# Patient Record
Sex: MALE | Race: WHITE | NOT HISPANIC OR LATINO | Employment: FULL TIME | URBAN - METROPOLITAN AREA
[De-identification: names, ages, dates, MRNs, and addresses within clinical notes are randomized per-mention and may not be internally consistent; named-entity substitution may affect disease eponyms.]

---

## 2020-02-04 ENCOUNTER — OFFICE VISIT (OUTPATIENT)
Dept: URGENT CARE | Age: 30
End: 2020-02-04
Payer: COMMERCIAL

## 2020-02-04 VITALS
OXYGEN SATURATION: 98 % | WEIGHT: 135 LBS | HEART RATE: 61 BPM | RESPIRATION RATE: 16 BRPM | SYSTOLIC BLOOD PRESSURE: 119 MMHG | HEIGHT: 68 IN | TEMPERATURE: 98.4 F | DIASTOLIC BLOOD PRESSURE: 73 MMHG | BODY MASS INDEX: 20.46 KG/M2

## 2020-02-04 DIAGNOSIS — J06.9 ACUTE UPPER RESPIRATORY INFECTION: Primary | ICD-10-CM

## 2020-02-04 DIAGNOSIS — J01.40 ACUTE NON-RECURRENT PANSINUSITIS: ICD-10-CM

## 2020-02-04 PROCEDURE — 99203 OFFICE O/P NEW LOW 30 MIN: CPT | Performed by: FAMILY MEDICINE

## 2020-02-04 PROCEDURE — 99283 EMERGENCY DEPT VISIT LOW MDM: CPT | Performed by: FAMILY MEDICINE

## 2020-02-04 PROCEDURE — G0382 LEV 3 HOSP TYPE B ED VISIT: HCPCS | Performed by: FAMILY MEDICINE

## 2020-02-04 RX ORDER — AMOXICILLIN AND CLAVULANATE POTASSIUM 875; 125 MG/1; MG/1
1 TABLET, FILM COATED ORAL EVERY 12 HOURS SCHEDULED
Qty: 20 TABLET | Refills: 0 | Status: SHIPPED | OUTPATIENT
Start: 2020-02-04 | End: 2020-02-14

## 2020-02-04 RX ORDER — CITALOPRAM 20 MG/1
TABLET ORAL
COMMUNITY
Start: 2020-01-08

## 2020-02-04 NOTE — LETTER
February 4, 2020     Patient: Tayla Irwin   YOB: 1990   Date of Visit: 2/4/2020       To Whom It May Concern: It is my medical opinion that Tayla Irwin may return to work on 02/06/2020  If you have any questions or concerns, please don't hesitate to call           Sincerely,        Katherine Barnett DO    CC: No Recipients

## 2020-02-04 NOTE — PROGRESS NOTES
[facility]        NAME: Kathie Alcala is a 26 y o  male  : 1990    MRN: [id number]  DATE: 2020  TIME: 12:07 PM    Assessment and Plan   Acute upper respiratory infection [J06 9]  1  Acute upper respiratory infection     2  Acute non-recurrent pansinusitis  amoxicillin-clavulanate (AUGMENTIN) 875-125 mg per tablet         Patient Instructions     Patient Instructions   Augmentin twice a day until finished (please take probiotics)  Cold/sinus medication as needed (try Flonase nasal spray 1 spray in each nostril once or twice a day)  Tylenol, or ibuprofen (Advil/Motrin), or try Aleve (please take with food) as needed  Recheck/follow-up with family physician as needed  Please go to the hospital emergency department if needed  Follow up with PCP in 3-5 days  Proceed to  ER if symptoms worsen  Chief Complaint     Chief Complaint   Patient presents with    Nasal Congestion     Pt complaining of congestion, fever, and pressure behind eyes x2 days  Has tried dayquil  History of Present Illness       Fever, congestion, sinus pressure      Review of Systems   Review of Systems   Constitutional: Positive for fever  HENT: Positive for congestion and sinus pressure  Respiratory: Negative  Cardiovascular: Negative  Musculoskeletal: Negative  Skin: Negative  Neurological: Negative            Current Medications       Current Outpatient Medications:     citalopram (CeleXA) 20 mg tablet, TAKE 1 TABLET BY MOUTH EVERY DAY, Disp: , Rfl:     acyclovir (ZOVIRAX) 800 mg tablet, Take 1 tablet by mouth 2 (two) times a day for 5 days, Disp: 10 tablet, Rfl: 0    amoxicillin-clavulanate (AUGMENTIN) 875-125 mg per tablet, Take 1 tablet by mouth every 12 (twelve) hours for 20 doses, Disp: 20 tablet, Rfl: 0    ibuprofen (MOTRIN) 600 mg tablet, Take 1 tablet by mouth every 6 (six) hours as needed for mild pain (Patient not taking: Reported on 2020), Disp: 30 tablet, Rfl: 0    oxyCODONE-acetaminophen (PERCOCET) 5-325 mg per tablet, Take 1 tablet by mouth every 6 (six) hours as needed for severe pain Max Daily Amount: 4 tablets (Patient not taking: Reported on 2/4/2020), Disp: 12 tablet, Rfl: 0    Current Allergies     Allergies as of 02/04/2020    (No Known Allergies)            The following portions of the patient's history were reviewed and updated as appropriate: allergies, current medications, past family history, past medical history, past social history, past surgical history and problem list      Past Medical History:   Diagnosis Date    Kidney stones        Past Surgical History:   Procedure Laterality Date    CYSTOSTOMY W/ STENT INSERTION         Family History   Problem Relation Age of Onset    No Known Problems Mother     No Known Problems Father          Medications have been verified  Objective   /73 (BP Location: Left arm, Patient Position: Sitting)   Pulse 61   Temp 98 4 °F (36 9 °C) (Temporal)   Resp 16   Ht 5' 8" (1 727 m)   Wt 61 2 kg (135 lb)   SpO2 98%   BMI 20 53 kg/m²        Physical Exam     Physical Exam   Constitutional: He is oriented to person, place, and time  He appears well-developed and well-nourished  Patient appears uncomfortable   HENT:   Nasal congestion; discomfort over the sinuses; injection of the oropharynx   Neck: Normal range of motion  Neck supple  Cardiovascular: Normal rate, regular rhythm and normal heart sounds  Pulmonary/Chest: Effort normal and breath sounds normal    Neurological: He is alert and oriented to person, place, and time  No nuchal rigidity   Skin:   Fair color and turgor   Psychiatric: He has a normal mood and affect  His behavior is normal    Nursing note and vitals reviewed

## 2020-02-04 NOTE — PATIENT INSTRUCTIONS
Augmentin twice a day until finished (please take probiotics)  Cold/sinus medication as needed (try Flonase nasal spray 1 spray in each nostril once or twice a day)  Tylenol, or ibuprofen (Advil/Motrin), or try Aleve (please take with food) as needed  Recheck/follow-up with family physician as needed  Please go to the hospital emergency department if needed

## 2021-12-30 ENCOUNTER — OFFICE VISIT (OUTPATIENT)
Dept: URGENT CARE | Age: 31
End: 2021-12-30
Payer: COMMERCIAL

## 2021-12-30 VITALS
HEIGHT: 69 IN | DIASTOLIC BLOOD PRESSURE: 69 MMHG | RESPIRATION RATE: 14 BRPM | BODY MASS INDEX: 20.73 KG/M2 | WEIGHT: 140 LBS | SYSTOLIC BLOOD PRESSURE: 119 MMHG | OXYGEN SATURATION: 99 % | HEART RATE: 73 BPM | TEMPERATURE: 98.7 F

## 2021-12-30 DIAGNOSIS — R07.9 CHEST PAIN, UNSPECIFIED TYPE: Primary | ICD-10-CM

## 2021-12-30 LAB
ATRIAL RATE: 68 BPM
P AXIS: 60 DEGREES
PR INTERVAL: 140 MS
QRS AXIS: 82 DEGREES
QRSD INTERVAL: 92 MS
QT INTERVAL: 394 MS
QTC INTERVAL: 418 MS
T WAVE AXIS: 61 DEGREES
VENTRICULAR RATE: 68 BPM

## 2021-12-30 PROCEDURE — 93010 ELECTROCARDIOGRAM REPORT: CPT | Performed by: INTERNAL MEDICINE

## 2021-12-30 PROCEDURE — 99213 OFFICE O/P EST LOW 20 MIN: CPT | Performed by: NURSE PRACTITIONER

## 2021-12-30 PROCEDURE — 93005 ELECTROCARDIOGRAM TRACING: CPT | Performed by: NURSE PRACTITIONER
